# Patient Record
Sex: FEMALE | Race: WHITE | NOT HISPANIC OR LATINO | Employment: OTHER | URBAN - NONMETROPOLITAN AREA
[De-identification: names, ages, dates, MRNs, and addresses within clinical notes are randomized per-mention and may not be internally consistent; named-entity substitution may affect disease eponyms.]

---

## 2023-11-01 ENCOUNTER — HOSPITAL ENCOUNTER (OUTPATIENT)
Dept: RADIOLOGY | Facility: HOSPITAL | Age: 78
Discharge: HOME | End: 2023-11-01
Payer: MEDICARE

## 2023-11-01 DIAGNOSIS — C18.9 MALIGNANT NEOPLASM OF COLON, UNSPECIFIED (MULTI): ICD-10-CM

## 2023-11-01 PROCEDURE — 71260 CT THORAX DX C+: CPT | Performed by: RADIOLOGY

## 2023-11-01 PROCEDURE — 71260 CT THORAX DX C+: CPT

## 2023-11-01 PROCEDURE — 2550000001 HC RX 255 CONTRASTS

## 2023-11-01 PROCEDURE — 74177 CT ABD & PELVIS W/CONTRAST: CPT | Performed by: RADIOLOGY

## 2023-11-01 RX ADMIN — IOHEXOL 75 ML: 350 INJECTION, SOLUTION INTRAVENOUS at 12:12

## 2023-11-03 PROBLEM — D15.1 PAPILLARY FIBROELASTOMA OF HEART (HHS-HCC): Status: ACTIVE | Noted: 2023-11-03

## 2023-11-03 PROBLEM — I49.3 PVC'S (PREMATURE VENTRICULAR CONTRACTIONS): Status: ACTIVE | Noted: 2023-11-03

## 2023-11-03 PROBLEM — K63.89 MASS OF COLON: Status: ACTIVE | Noted: 2023-11-03

## 2023-11-03 PROBLEM — C20 RECTAL ADENOCARCINOMA (MULTI): Status: ACTIVE | Noted: 2023-11-03

## 2023-11-03 PROBLEM — I47.29 VENTRICULAR TACHYCARDIA (PAROXYSMAL) (MULTI): Status: ACTIVE | Noted: 2023-11-03

## 2023-11-03 PROBLEM — K76.9 LIVER LESION: Status: ACTIVE | Noted: 2023-11-03

## 2023-11-03 PROBLEM — H25.13 NUCLEAR SENILE CATARACT OF BOTH EYES: Status: ACTIVE | Noted: 2018-05-23

## 2023-11-03 PROBLEM — R91.1 PULMONARY NODULE: Status: ACTIVE | Noted: 2023-11-03

## 2023-11-03 PROBLEM — C18.9 COLON CANCER (MULTI): Status: ACTIVE | Noted: 2023-11-03

## 2023-11-03 RX ORDER — ACYCLOVIR 400 MG/1
TABLET ORAL
COMMUNITY
Start: 2018-05-07 | End: 2023-11-06 | Stop reason: ALTCHOICE

## 2023-11-03 RX ORDER — COVID-19 ANTIGEN TEST
KIT MISCELLANEOUS
COMMUNITY
Start: 2022-12-14

## 2023-11-03 RX ORDER — ACETAMINOPHEN 500 MG
1 TABLET ORAL 2 TIMES DAILY PRN
COMMUNITY

## 2023-11-03 RX ORDER — PAROXETINE HYDROCHLORIDE 20 MG/1
20 TABLET, FILM COATED ORAL DAILY
COMMUNITY
End: 2023-11-06 | Stop reason: ALTCHOICE

## 2023-11-05 NOTE — PROGRESS NOTES
Patient ID: Odalys Alcantara is a 78 y.o. female.    Diagnoses:   Stage 3 (pT3 pN2->4/18 Lns +ve) sigmoid colon adenocarcinoma, pMMR, S/P resection in 09/2016.    Genomic profile:  pMMR.    Assessment and Plan:  This is a pleasant person who had localized colon cancer resected in 2016.  She is asymptomatic and examination is unrevealing.  Her last CT scan from November 1, 2023 was unremarkable.  I do not believe she needs oncology follow-up anymore.  I instructed her to follow-up with her primary care physician.    Follow up plan-none.      Providers:  Surgeon:   Kendallc:  Zainc:    Chief complaint:  Stage 3 (pT3 pN2->4/18 Lns +ve) sigmoid colon adenocarcinoma, pMMR, S/P resection in 09/2016.    HPI:  ONCOLOGIC HISTORY-  08/18/2016: Diagnosed with sigmoid colon adenocarcinoma, pMMR.    9/23/16- she underwent a sigmoid colon resection with liver biopsy. Liver Biopsy was negative for malignancy. Colon cancer pathology revealed pT3N2 lesion with 4/18 positive nodes with clear surgical margins.    Later part of 2016/early 2017: She began adjuvant chemotherapy with XELOX. After severe side effects from oxaliplatin, she then began single agent Capecitabine 2/21/17. She went on to complete 28 cycles of Capecitabine. Last cycle of chemotherapy was in September 2018. Since then  she has been on active surveillance with Dr. Aden.     9-: Colonoscopy unremarkable.    3-: CT CAP-> no evidence of recurrent/metastatic cancer.    11-1-23: CT scan of chest, abdomen and pelvis is unremarkable.    Interval history:  Denies any complaint.  Denies nausea or vomiting or abdominal pain.  Activity level is normal.  Her appetite is normal.  No history of recent weight loss.    Past Medical History:   Past Medical History:  No date: Personal history of other malignant neoplasm of large   intestine      Comment:  History of malignant neoplasm of colon   Surgical History:    Past Surgical History:   Procedure Laterality Date     COLECTOMY PARTIAL / TOTAL  10/04/2016    Partial Colectomy - Sigmoid    OTHER SURGICAL HISTORY  09/01/2021    Thyroid surgery      Family History:    Family History   Problem Relation Name Age of Onset    Heart attack Mother  95    Diabetes Father      Cancer Father      Colon cancer Father       Family Oncology History:    Cancer-related family history includes Cancer in her father; Colon cancer in her father.  Social History:    Social History     Tobacco Use    Smoking status: Never    Smokeless tobacco: Never   Vaping Use    Vaping Use: Never used   Substance Use Topics    Alcohol use: Not Currently    Drug use: Never        Allergies  Allergies   Allergen Reactions    Sulfa (Sulfonamide Antibiotics) Unknown        Medications  Current Outpatient Medications   Medication Instructions    calcium carbonate-vitamin D3 600 mg-20 mcg (800 unit) tablet 1 tablet, oral, 2 times daily PRN    [employer] COVID-19 Ag Home Test kit AS DIRECTED          Objective   VS: /84 (BP Location: Right arm, Patient Position: Sitting, BP Cuff Size: Adult)   Pulse 77   Temp 35.8 °C (96.4 °F)   Resp 18   Wt 68.6 kg (151 lb 2 oz)   SpO2 94%   BMI 25.58 kg/m²   Weight:   Vitals:    11/06/23 1455   Weight: 68.6 kg (151 lb 2 oz)        PHYSICAL EXAMINATION  ECOG performance status-0.  VS:  /84 (BP Location: Right arm, Patient Position: Sitting, BP Cuff Size: Adult)   Pulse 77   Temp 35.8 °C (96.4 °F)   Resp 18   Wt 68.6 kg (151 lb 2 oz)   SpO2 94%   BMI 25.58 kg/m²   Weight:   Vitals:    11/06/23 1455   Weight: 68.6 kg (151 lb 2 oz)       BSA: 1.77 meters squared   Pain Scale: 0    Constitutional: Awake/alert/oriented x3, cooperative and answers questions appropriately.     Eyes: No pallor, conjunctival injection, clear sclera.    Mouth: No ulceration. No thrush.     Head/Neck: Neck supple, no apparent injury, thyroid without mass or tenderness, No JVD, trachea midline, no bruits.     Respiratory/Thorax: Normal breath  sounds with bilaterally symmetrical chest expansion. No dullness.      Cardiovascular: No audible murmurs, normal heart sounds. No pericardial rub.     Gastrointestinal: Nondistended, soft, non-tender, no rebound tenderness or guarding, no masses palpable, no organomegaly, +BS, no bruits. No ascites.     Musculoskeletal: No joint swelling, redness.      Extremities: normal extremities, no cyanosis edema, contusions or wounds, no clubbing.     Lymphatic: No significant lymphadenopathy.     Skin: Warm and dry, no lesions, no rashes.     Labs                         Image  CT chest abdomen pelvis w IV contrast    Result Date: 11/2/2023  Impression: No significant change when compared with the 10/31/2022 study with stable appearance of subcentimeter pulmonary nodules. No definite new sites of disease noted on current study.   I personally reviewed the images/study and I agree with the findings as stated by radiology resident Magaly Boucher MD. This study was interpreted at [facility], [place], [place].   Signed by: Kory Bridges 11/2/2023 11:23 PM Dictation workstation:   [id number]          Rosa Beltrán MD.

## 2023-11-06 ENCOUNTER — OFFICE VISIT (OUTPATIENT)
Dept: HEMATOLOGY/ONCOLOGY | Facility: CLINIC | Age: 78
End: 2023-11-06
Payer: MEDICARE

## 2023-11-06 VITALS
OXYGEN SATURATION: 94 % | WEIGHT: 151.13 LBS | RESPIRATION RATE: 18 BRPM | SYSTOLIC BLOOD PRESSURE: 152 MMHG | TEMPERATURE: 96.4 F | HEART RATE: 77 BPM | BODY MASS INDEX: 25.58 KG/M2 | DIASTOLIC BLOOD PRESSURE: 84 MMHG

## 2023-11-06 DIAGNOSIS — C18.9 COLON CANCER (MULTI): ICD-10-CM

## 2023-11-06 PROCEDURE — 99214 OFFICE O/P EST MOD 30 MIN: CPT | Performed by: INTERNAL MEDICINE

## 2023-11-06 PROCEDURE — 1036F TOBACCO NON-USER: CPT | Performed by: INTERNAL MEDICINE

## 2023-11-06 PROCEDURE — 1126F AMNT PAIN NOTED NONE PRSNT: CPT | Performed by: INTERNAL MEDICINE

## 2023-11-06 PROCEDURE — 1159F MED LIST DOCD IN RCRD: CPT | Performed by: INTERNAL MEDICINE

## 2023-11-06 ASSESSMENT — PAIN SCALES - GENERAL: PAINLEVEL: 0-NO PAIN

## 2023-11-06 ASSESSMENT — PATIENT HEALTH QUESTIONNAIRE - PHQ9
2. FEELING DOWN, DEPRESSED OR HOPELESS: NOT AT ALL
1. LITTLE INTEREST OR PLEASURE IN DOING THINGS: NOT AT ALL
SUM OF ALL RESPONSES TO PHQ9 QUESTIONS 1 AND 2: 0

## 2023-11-06 ASSESSMENT — COLUMBIA-SUICIDE SEVERITY RATING SCALE - C-SSRS
1. IN THE PAST MONTH, HAVE YOU WISHED YOU WERE DEAD OR WISHED YOU COULD GO TO SLEEP AND NOT WAKE UP?: NO
2. HAVE YOU ACTUALLY HAD ANY THOUGHTS OF KILLING YOURSELF?: NO
6. HAVE YOU EVER DONE ANYTHING, STARTED TO DO ANYTHING, OR PREPARED TO DO ANYTHING TO END YOUR LIFE?: NO

## 2023-11-06 ASSESSMENT — ENCOUNTER SYMPTOMS
DEPRESSION: 0
OCCASIONAL FEELINGS OF UNSTEADINESS: 0
LOSS OF SENSATION IN FEET: 0

## 2023-11-06 NOTE — PROGRESS NOTES
Patient is here for her follow up, alone. She denies any symptoms, no new medical issues.     Medications and allergies reviewed.

## 2024-09-20 ENCOUNTER — LAB (OUTPATIENT)
Dept: LAB | Facility: LAB | Age: 79
End: 2024-09-20
Payer: MEDICARE

## 2024-09-20 DIAGNOSIS — I10 ESSENTIAL (PRIMARY) HYPERTENSION: ICD-10-CM

## 2024-09-20 DIAGNOSIS — R53.83 FATIGUE: ICD-10-CM

## 2024-09-20 DIAGNOSIS — D64.9 ANEMIA: Primary | ICD-10-CM

## 2024-09-20 LAB
ALBUMIN SERPL BCP-MCNC: 4 G/DL (ref 3.4–5)
ALP SERPL-CCNC: 70 U/L (ref 33–136)
ALT SERPL W P-5'-P-CCNC: 8 U/L (ref 7–45)
ANION GAP SERPL CALC-SCNC: 13 MMOL/L (ref 10–20)
AST SERPL W P-5'-P-CCNC: 10 U/L (ref 9–39)
BASOPHILS # BLD AUTO: 0.03 X10*3/UL (ref 0–0.1)
BASOPHILS NFR BLD AUTO: 0.7 %
BILIRUB SERPL-MCNC: 0.8 MG/DL (ref 0–1.2)
BUN SERPL-MCNC: 17 MG/DL (ref 6–23)
CALCIUM SERPL-MCNC: 9.2 MG/DL (ref 8.6–10.3)
CHLORIDE SERPL-SCNC: 104 MMOL/L (ref 98–107)
CHOLEST SERPL-MCNC: 211 MG/DL (ref 0–199)
CHOLESTEROL/HDL RATIO: 2.7
CO2 SERPL-SCNC: 29 MMOL/L (ref 21–32)
CREAT SERPL-MCNC: 0.69 MG/DL (ref 0.5–1.05)
EGFRCR SERPLBLD CKD-EPI 2021: 90 ML/MIN/1.73M*2
EOSINOPHIL # BLD AUTO: 0.1 X10*3/UL (ref 0–0.4)
EOSINOPHIL NFR BLD AUTO: 2.3 %
ERYTHROCYTE [DISTWIDTH] IN BLOOD BY AUTOMATED COUNT: 12 % (ref 11.5–14.5)
GLUCOSE SERPL-MCNC: 109 MG/DL (ref 74–99)
HCT VFR BLD AUTO: 45.9 % (ref 36–46)
HDLC SERPL-MCNC: 77 MG/DL
HGB BLD-MCNC: 14.6 G/DL (ref 12–16)
IMM GRANULOCYTES # BLD AUTO: 0.01 X10*3/UL (ref 0–0.5)
IMM GRANULOCYTES NFR BLD AUTO: 0.2 % (ref 0–0.9)
LDLC SERPL CALC-MCNC: 110 MG/DL
LYMPHOCYTES # BLD AUTO: 1.52 X10*3/UL (ref 0.8–3)
LYMPHOCYTES NFR BLD AUTO: 34.7 %
MCH RBC QN AUTO: 29.4 PG (ref 26–34)
MCHC RBC AUTO-ENTMCNC: 31.8 G/DL (ref 32–36)
MCV RBC AUTO: 93 FL (ref 80–100)
MONOCYTES # BLD AUTO: 0.42 X10*3/UL (ref 0.05–0.8)
MONOCYTES NFR BLD AUTO: 9.6 %
NEUTROPHILS # BLD AUTO: 2.3 X10*3/UL (ref 1.6–5.5)
NEUTROPHILS NFR BLD AUTO: 52.5 %
NON HDL CHOLESTEROL: 134 MG/DL (ref 0–149)
NRBC BLD-RTO: 0 /100 WBCS (ref 0–0)
PLATELET # BLD AUTO: 297 X10*3/UL (ref 150–450)
POTASSIUM SERPL-SCNC: 4.2 MMOL/L (ref 3.5–5.3)
PROT SERPL-MCNC: 6.7 G/DL (ref 6.4–8.2)
RBC # BLD AUTO: 4.96 X10*6/UL (ref 4–5.2)
SODIUM SERPL-SCNC: 142 MMOL/L (ref 136–145)
TRIGL SERPL-MCNC: 121 MG/DL (ref 0–149)
VLDL: 24 MG/DL (ref 0–40)
WBC # BLD AUTO: 4.4 X10*3/UL (ref 4.4–11.3)

## 2024-09-20 PROCEDURE — 80053 COMPREHEN METABOLIC PANEL: CPT

## 2024-09-20 PROCEDURE — 80061 LIPID PANEL: CPT

## 2024-09-20 PROCEDURE — 85025 COMPLETE CBC W/AUTO DIFF WBC: CPT

## 2024-09-20 PROCEDURE — 36415 COLL VENOUS BLD VENIPUNCTURE: CPT
